# Patient Record
Sex: MALE | Race: WHITE | NOT HISPANIC OR LATINO | URBAN - METROPOLITAN AREA
[De-identification: names, ages, dates, MRNs, and addresses within clinical notes are randomized per-mention and may not be internally consistent; named-entity substitution may affect disease eponyms.]

---

## 2017-07-22 ENCOUNTER — EMERGENCY (EMERGENCY)
Facility: HOSPITAL | Age: 27
LOS: 1 days | Discharge: HOME | End: 2017-07-22
Attending: EMERGENCY MEDICINE | Admitting: EMERGENCY MEDICINE
Payer: SELF-PAY

## 2017-07-22 VITALS
HEART RATE: 96 BPM | OXYGEN SATURATION: 98 % | DIASTOLIC BLOOD PRESSURE: 79 MMHG | TEMPERATURE: 98 F | SYSTOLIC BLOOD PRESSURE: 136 MMHG | RESPIRATION RATE: 16 BRPM

## 2017-07-22 PROCEDURE — 99284 EMERGENCY DEPT VISIT MOD MDM: CPT

## 2017-07-22 RX ORDER — CEPHALEXIN 500 MG
1 CAPSULE ORAL
Qty: 40 | Refills: 0 | OUTPATIENT
Start: 2017-07-22 | End: 2017-08-01

## 2017-07-22 NOTE — ED PROVIDER NOTE - PROGRESS NOTE DETAILS
Meng Miranda - as per Dr. Navarreet, changing keflex dose to four times a day rather than TID which was prescribed by urgent care. pt ambulating well and amenable for dc home with pcp follow up.

## 2017-07-22 NOTE — ED PROVIDER NOTE - OBJECTIVE STATEMENT
27 y/o male hx of obesity, presents to ED for left knee pain and swelling x 4 days. Pt noticed a bug bite/cut on is knee and was scratching it last week. No longer itchy. Few days later redness and swelling developed. Ambulating well without assistance. Was seen at an urgent care yesterday and diagnosed with cellulitis and sent home with PO Keflex and Bactrim. Took 2 alleve yesterday, no pain meds today. States he thinks redness is improving however came today for a second opinion. No fever, chills, cp, n/v, sick contacts, travel hx, weakness, numbness, tingling, loss of ROM, streaking, hx of stds. 25 y/o male hx of obesity, presents to ED for left knee pain and swelling x 4 days. Pt noticed a bug bite/cut on is knee and was scratching it last week. No longer itchy. Few days later redness and swelling developed. Ambulating well without assistance. Was seen at an urgent care yesterday and diagnosed with cellulitis and sent home with PO Keflex and Bactrim. Took 2 alleve yesterday, no pain meds today. States he thinks redness is improving however came today for a second opinion. No trauma, fever, chills, cp, n/v, sick contacts, travel hx, weakness, numbness, tingling, loss of ROM, streaking, hx of stds.

## 2017-07-22 NOTE — ED PROVIDER NOTE - PLAN OF CARE
Follow up with Internal Medicine with 48 hours, referral list given. Take Keflex four times a day as prescribed for 10 days. Continue taking Bactrim as prescribed by your other doctor. Take Probiotics over the counter. Take Ibuprofen 600mg (three over the counter pills) as needed every 8 hours for moderate pain, take with food. Take Percocet every 8 hours as needed for severe pain, DO NOT DRIVE as this may cause drowsiness. Return to ER for any new or worsening symptoms, fever, chills, redness, swelling, streaking, numbness, tingling, weakness, difficulty ambulating or any other concerns.

## 2017-07-22 NOTE — ED PROVIDER NOTE - ATTENDING CONTRIBUTION TO CARE
Dr. Navarrete:  I performed a face to face bedside interview with patient regarding history of present illness, review of symptoms and past medical history. I completed an independent physical exam.  I have discussed patient's plan of care with PA.   I agree with note as stated above, having amended the EMR as needed to reflect my findings.   This includes HISTORY OF PRESENT ILLNESS, HIV, PAST MEDICAL/SURGICAL/FAMILY/SOCIAL HISTORY, ALLERGIES AND HOME MEDICATIONS, REVIEW OF SYSTEMS, PHYSICAL EXAM, and any PROGRESS NOTES during the time I functioned as the attending physician for this patient.    26M h/o obesity presents with left lower extremity redness, swelling, pain.  Was seen at urgent care yesterday and diagnosed with cellulitis, discharged with keflex TID and Bactrim BID.  Presents today for second opinion.  Denies fevers and constitutional symptoms.    Exam:  - well appearing  - rrr  - ctab  - abd soft, ntnd  - +large area of blanching erythema over left knee and shin to lateral calf, distally neurovascularly intact; knee freely mobile     A/P  - cellulitis  - given that pt has only been on the abx for less than 24hrs, recommend continue abx, and instructed to watch the area of erythema for any worsening in the next 24hrs.    - discharge with return precautions

## 2017-07-22 NOTE — ED PROVIDER NOTE - MUSCULOSKELETAL MINIMAL EXAM
normal range of motion/Full ROM of knee, ambulating well, +moderate swelling and warmth, +diffuse redness over knee extending to tibial plateau, no streaking, no abscess, no calf tenderness, DP pulse 2+ b/l/atraumatic

## 2017-07-22 NOTE — ED PROVIDER NOTE - MEDICAL DECISION MAKING DETAILS
25 y/o male hx of obesity, presents to ED for left knee pain and swelling x 4 days. Pt noticed a bug bite/cut on is knee and was scratching it last week. plan: pt education, continue PO abx, d/c home, return instructions

## 2017-07-22 NOTE — ED ADULT TRIAGE NOTE - CHIEF COMPLAINT QUOTE
c/o left knee pain, redness and swelling since Wednesday, was seen at [facility] yesterday and placed on Keflex and Bactrim for cellulitis, patient states redness spreading from markings placed on knee.

## 2022-08-29 NOTE — ED PROVIDER NOTE - CPE EDP RESP NORM
Gen: Patient is well-appearing, NAD, AAOx3, able to ambulate without assistance  HEENT: NCAT, normal conjunctiva, tongue midline, oral mucosa moist  Lung: CTAB, no respiratory distress, no wheezes/rhonchi/rales B/L, speaking in full sentences  CV: RRR, no murmurs, rubs or gallops, distal pulses 2+ b/l  Abd: soft, mild epigastric, periumbilical pain on palpation, ND, no guarding, no rigidity, no rebound tenderness, no CVA tenderness   MSK: lipoma noted on back exam, ROM normal in UE/LE, no back TTP  Neuro: No focal sensory or motor deficits  Skin: Warm, well perfused, no rash, no leg swelling  Psych: normal affect, calm
normal...